# Patient Record
Sex: MALE | Race: ASIAN | Employment: FULL TIME | URBAN - METROPOLITAN AREA
[De-identification: names, ages, dates, MRNs, and addresses within clinical notes are randomized per-mention and may not be internally consistent; named-entity substitution may affect disease eponyms.]

---

## 2023-03-06 LAB
ALANINE AMINOTRANSFERASE (SGPT) (U/L) IN SER/PLAS: 31 U/L (ref 10–52)
ALBUMIN (G/DL) IN SER/PLAS: 4.2 G/DL (ref 3.4–5)
ALKALINE PHOSPHATASE (U/L) IN SER/PLAS: 91 U/L (ref 33–120)
ANION GAP IN SER/PLAS: 9 MMOL/L (ref 10–20)
ASPARTATE AMINOTRANSFERASE (SGOT) (U/L) IN SER/PLAS: 24 U/L (ref 9–39)
BASOPHILS (10*3/UL) IN BLOOD BY AUTOMATED COUNT: 0.02 X10E9/L (ref 0–0.1)
BASOPHILS/100 LEUKOCYTES IN BLOOD BY AUTOMATED COUNT: 0.4 % (ref 0–2)
BILIRUBIN TOTAL (MG/DL) IN SER/PLAS: 0.7 MG/DL (ref 0–1.2)
CALCIDIOL (25 OH VITAMIN D3) (NG/ML) IN SER/PLAS: 23 NG/ML
CALCIUM (MG/DL) IN SER/PLAS: 9 MG/DL (ref 8.6–10.3)
CARBON DIOXIDE, TOTAL (MMOL/L) IN SER/PLAS: 30 MMOL/L (ref 21–32)
CHLORIDE (MMOL/L) IN SER/PLAS: 106 MMOL/L (ref 98–107)
CHOLESTEROL (MG/DL) IN SER/PLAS: 146 MG/DL (ref 0–199)
CHOLESTEROL IN HDL (MG/DL) IN SER/PLAS: 60.5 MG/DL
CHOLESTEROL/HDL RATIO: 2.4
CREATININE (MG/DL) IN SER/PLAS: 0.98 MG/DL (ref 0.5–1.3)
EOSINOPHILS (10*3/UL) IN BLOOD BY AUTOMATED COUNT: 0.12 X10E9/L (ref 0–0.7)
EOSINOPHILS/100 LEUKOCYTES IN BLOOD BY AUTOMATED COUNT: 2.4 % (ref 0–6)
ERYTHROCYTE DISTRIBUTION WIDTH (RATIO) BY AUTOMATED COUNT: 13 % (ref 11.5–14.5)
ERYTHROCYTE MEAN CORPUSCULAR HEMOGLOBIN CONCENTRATION (G/DL) BY AUTOMATED: 33 G/DL (ref 32–36)
ERYTHROCYTE MEAN CORPUSCULAR VOLUME (FL) BY AUTOMATED COUNT: 94 FL (ref 80–100)
ERYTHROCYTES (10*6/UL) IN BLOOD BY AUTOMATED COUNT: 5.17 X10E12/L (ref 4.5–5.9)
GFR MALE: >90 ML/MIN/1.73M2
GLUCOSE (MG/DL) IN SER/PLAS: 90 MG/DL (ref 74–99)
HEMATOCRIT (%) IN BLOOD BY AUTOMATED COUNT: 48.5 % (ref 41–52)
HEMOGLOBIN (G/DL) IN BLOOD: 16 G/DL (ref 13.5–17.5)
IMMATURE GRANULOCYTES/100 LEUKOCYTES IN BLOOD BY AUTOMATED COUNT: 0.4 % (ref 0–0.9)
LDL: 73 MG/DL (ref 0–99)
LEUKOCYTES (10*3/UL) IN BLOOD BY AUTOMATED COUNT: 5 X10E9/L (ref 4.4–11.3)
LYMPHOCYTES (10*3/UL) IN BLOOD BY AUTOMATED COUNT: 1.2 X10E9/L (ref 1.2–4.8)
LYMPHOCYTES/100 LEUKOCYTES IN BLOOD BY AUTOMATED COUNT: 23.9 % (ref 13–44)
MONOCYTES (10*3/UL) IN BLOOD BY AUTOMATED COUNT: 0.39 X10E9/L (ref 0.1–1)
MONOCYTES/100 LEUKOCYTES IN BLOOD BY AUTOMATED COUNT: 7.8 % (ref 2–10)
NEUTROPHILS (10*3/UL) IN BLOOD BY AUTOMATED COUNT: 3.27 X10E9/L (ref 1.2–7.7)
NEUTROPHILS/100 LEUKOCYTES IN BLOOD BY AUTOMATED COUNT: 65.1 % (ref 40–80)
PLATELETS (10*3/UL) IN BLOOD AUTOMATED COUNT: 215 X10E9/L (ref 150–450)
POTASSIUM (MMOL/L) IN SER/PLAS: 4.5 MMOL/L (ref 3.5–5.3)
PROSTATE SPECIFIC ANTIGEN,SCREEN: 0.61 NG/ML (ref 0–4)
PROTEIN TOTAL: 6.6 G/DL (ref 6.4–8.2)
SODIUM (MMOL/L) IN SER/PLAS: 140 MMOL/L (ref 136–145)
THYROTROPIN (MIU/L) IN SER/PLAS BY DETECTION LIMIT <= 0.05 MIU/L: 0.61 MIU/L (ref 0.44–3.98)
TRIGLYCERIDE (MG/DL) IN SER/PLAS: 63 MG/DL (ref 0–149)
UREA NITROGEN (MG/DL) IN SER/PLAS: 18 MG/DL (ref 6–23)
VLDL: 13 MG/DL (ref 0–40)

## 2024-08-29 ENCOUNTER — LAB (OUTPATIENT)
Dept: LAB | Facility: LAB | Age: 54
End: 2024-08-29

## 2024-08-30 LAB — COTININE UR QL SCN: NEGATIVE

## 2024-12-13 NOTE — PROGRESS NOTES
12/16/2024 CC: 54 y.o. presents for glaucoma evaluation.    Past ocular history: Uveitis  Family history: Glaucoma in uncle  Past medical history: Sarcoidosis  Social history: denies tobacco     Eye medications:   Both eyes: none    Allergy:  NKDA    Testing:    HVF 24-2 12/16/2024: OD- Dense SAD involving fixation, early IAD, MD -19.1. OS- IAD>SAD approaching fixation, MD -20.3 dB    OCT 12/16/2024: Artifacts-OD- thin I/S, avg 86. OS- thin I/S avg 70 um.GCA: OD- thin IT (raphae), OS- thin ST (raphae), I. Mac:Regular macular contour, /241    Pachymetry 12/16/2024: 599/599    Gonioscopy 12/16/2024: open OU, few PAS OD    Tmax: unknown    Assessment:   Primary open angle glaucoma, severe stage OU  - Positive fhx  - Thicker C  - Large slight asymmetrical CDR OS>OD, tilted myopic disc  - He had 1 episode of uveitis OD (sarcoidosis) 14 yrs ago, was treated with topical/systemic steroid for 4 months  - Possible steroid response  - Testing: OCT I/S thin OS>OD, confounded by retina. HVF: Dense arcuates OS>OD  - Explained glaucoma is forever, cannot reverse LAN already sustained, patient can still go on to undergo vision loss form it if progresses in future. Our goals are to slow down LAN damage, but cannot FIX it. May require additional glaucoma procedures to stabilize glaucoma in future.  Target IOP: low teens  -IOP 12/16/2024:17/19    RE  - High Myopia    NS cataract  - CC, NVS    Plan:   I explained my findings. POAG, Severe, IOP above target    Eye medications:   Both eyes: Start Latanoprost qhs, Brimonidine bid    Return in 4 wks, IOP check

## 2024-12-16 ENCOUNTER — APPOINTMENT (OUTPATIENT)
Dept: OPHTHALMOLOGY | Age: 54
End: 2024-12-16
Payer: COMMERCIAL

## 2024-12-16 DIAGNOSIS — H40.1133 PRIMARY OPEN ANGLE GLAUCOMA (POAG) OF BOTH EYES, SEVERE STAGE: Primary | ICD-10-CM

## 2024-12-16 PROCEDURE — 92134 CPTRZ OPH DX IMG PST SGM RTA: CPT | Performed by: OPHTHALMOLOGY

## 2024-12-16 PROCEDURE — 76514 ECHO EXAM OF EYE THICKNESS: CPT | Performed by: OPHTHALMOLOGY

## 2024-12-16 PROCEDURE — 92083 EXTENDED VISUAL FIELD XM: CPT | Performed by: OPHTHALMOLOGY

## 2024-12-16 PROCEDURE — 92020 GONIOSCOPY: CPT | Performed by: OPHTHALMOLOGY

## 2024-12-16 PROCEDURE — 99204 OFFICE O/P NEW MOD 45 MIN: CPT | Performed by: OPHTHALMOLOGY

## 2024-12-16 RX ORDER — BRIMONIDINE TARTRATE 2 MG/ML
1 SOLUTION/ DROPS OPHTHALMIC 2 TIMES DAILY
Qty: 15 ML | Refills: 3 | Status: SHIPPED | OUTPATIENT
Start: 2024-12-16

## 2024-12-16 RX ORDER — LATANOPROST 50 UG/ML
1 SOLUTION/ DROPS OPHTHALMIC NIGHTLY
Qty: 2.5 ML | Refills: 3 | Status: SHIPPED | OUTPATIENT
Start: 2024-12-16 | End: 2025-03-16

## 2024-12-16 RX ORDER — BRIMONIDINE TARTRATE 2 MG/ML
1 SOLUTION/ DROPS OPHTHALMIC 2 TIMES DAILY
Qty: 15 ML | Refills: 3 | Status: SHIPPED | OUTPATIENT
Start: 2024-12-16 | End: 2025-03-16

## 2024-12-16 RX ORDER — BRIMONIDINE TARTRATE 2 MG/ML
1 SOLUTION/ DROPS OPHTHALMIC 2 TIMES DAILY
Qty: 15 ML | Refills: 3 | Status: SHIPPED | OUTPATIENT
Start: 2024-12-16 | End: 2024-12-16

## 2024-12-16 RX ORDER — LATANOPROST 50 UG/ML
1 SOLUTION/ DROPS OPHTHALMIC NIGHTLY
Qty: 2.5 ML | Refills: 3 | Status: SHIPPED | OUTPATIENT
Start: 2024-12-16 | End: 2024-12-16

## 2024-12-16 RX ORDER — LATANOPROST 50 UG/ML
1 SOLUTION/ DROPS OPHTHALMIC NIGHTLY
Qty: 2.5 ML | Refills: 3 | Status: SHIPPED | OUTPATIENT
Start: 2024-12-16

## 2024-12-16 ASSESSMENT — CONF VISUAL FIELD
OS_INFERIOR_TEMPORAL_RESTRICTION: 0
OD_SUPERIOR_TEMPORAL_RESTRICTION: 0
OS_NORMAL: 1
OD_NORMAL: 1
OS_SUPERIOR_NASAL_RESTRICTION: 0
OD_INFERIOR_TEMPORAL_RESTRICTION: 0
OD_SUPERIOR_NASAL_RESTRICTION: 0
OS_SUPERIOR_TEMPORAL_RESTRICTION: 0
OS_INFERIOR_NASAL_RESTRICTION: 0
OD_INFERIOR_NASAL_RESTRICTION: 0

## 2024-12-16 ASSESSMENT — ENCOUNTER SYMPTOMS
PSYCHIATRIC NEGATIVE: 0
HEMATOLOGIC/LYMPHATIC NEGATIVE: 0
EYES NEGATIVE: 1
RESPIRATORY NEGATIVE: 0
ENDOCRINE COMMENTS: BRIMONIDINE
MUSCULOSKELETAL NEGATIVE: 0
ENDOCRINE NEGATIVE: 0
NEUROLOGICAL NEGATIVE: 0
ALLERGIC/IMMUNOLOGIC NEGATIVE: 0
CONSTITUTIONAL NEGATIVE: 0
CARDIOVASCULAR NEGATIVE: 0
GASTROINTESTINAL NEGATIVE: 0

## 2024-12-16 ASSESSMENT — VISUAL ACUITY
OS_CC+: +1
OD_CC+: +1
OS_CC: 20/30
OD_CC: 20/30
CORRECTION_TYPE: GLASSES
METHOD: SNELLEN - LINEAR

## 2024-12-16 ASSESSMENT — TONOMETRY
OS_IOP_MMHG: 19
OD_IOP_MMHG: 16
OD_IOP_MMHG: 17
IOP_METHOD: TONOPEN
IOP_METHOD: GOLDMANN APPLANATION
OS_IOP_MMHG: 23

## 2024-12-16 ASSESSMENT — GONIOSCOPY
OS_NASAL: SS
OS_INFERIOR: SS
OS_TEMPORAL: SS
OD_TEMPORAL: SS
OD_INFERIOR: SS
OD_NASAL: SS
OD_SUPERIOR: SS
OS_SUPERIOR: SS

## 2024-12-16 ASSESSMENT — PACHYMETRY
OD_CT(UM): 599
OS_CT(UM): 599

## 2024-12-16 ASSESSMENT — EXTERNAL EXAM - RIGHT EYE: OD_EXAM: NORMAL

## 2024-12-16 ASSESSMENT — CUP TO DISC RATIO
OD_RATIO: 0.8
OS_RATIO: 0.9

## 2024-12-16 ASSESSMENT — SLIT LAMP EXAM - LIDS
COMMENTS: NORMAL
COMMENTS: NORMAL

## 2024-12-16 ASSESSMENT — EXTERNAL EXAM - LEFT EYE: OS_EXAM: NORMAL

## 2025-01-19 NOTE — PROGRESS NOTES
1/20/2025 CC: 54 y.o. presents for 1 mo glaucoma FU w/ IOP check    Past ocular history: Uveitis  Family history: Glaucoma in uncle  Past medical history: Sarcoidosis  Social history: denies tobacco     Eye medications:   Both eyes: Latanoprost qhs, Brimonidine bid    Allergy:  NKDA    Testing:    HVF 24-2 12/16/2024: OD- Dense SAD involving fixation, early IAD, MD -19.1. OS- IAD>SAD approaching fixation, MD -20.3 dB    OCT 12/16/2024: Artifacts-OD- thin I/S, avg 86. OS- thin I/S avg 70 um.GCA: OD- thin IT (raphae), OS- thin ST (raphae), I. Mac:Regular macular contour, /241    Pachymetry 12/16/2024: 599/599    Gonioscopy 12/16/2024: open OU, few PAS OD    Tmax: unknown    Assessment:   Primary open angle glaucoma, severe stage OU  - Positive fhx  - Thicker C  - Large slight asymmetrical CDR OS>OD, tilted myopic disc  - He had 1 episode of uveitis OD (sarcoidosis) 14 yrs ago, was treated with topical/systemic steroid for 4 months  - Possible steroid response  - Testing: OCT I/S thin OS>OD, confounded by retina. HVF: Dense arcuates OS>OD  - Explained glaucoma is forever, cannot reverse LAN already sustained, patient can still go on to undergo vision loss form it if progresses in future. Our goals are to slow down LAN damage, but cannot FIX it. May require additional glaucoma procedures to stabilize glaucoma in future.  Target IOP: low teens  -IOP 12/16/2024:17/19. Will start Latanoprost qhs , Brimonidine bid.  -1/20/2025: IOP 13/16, VA/exam stable. Will increase Brimonidine to TID OS and schedule for SLT after 2 weeks.    RE  - High Myopia    NS cataract  - CC, NVS    Plan:   I explained my findings. POAG, Severe, IOP at at target OD/bdl OS.    Eye medications:   Right eye: continue Brimonidine bid  Left eye: Increase Brimonidine to tid  Both eyes: continue Latanoprost qhs    Return in 2 weeks, possible SLT OS

## 2025-01-20 ENCOUNTER — APPOINTMENT (OUTPATIENT)
Dept: PRIMARY CARE | Facility: CLINIC | Age: 55
End: 2025-01-20
Payer: COMMERCIAL

## 2025-01-20 ENCOUNTER — APPOINTMENT (OUTPATIENT)
Dept: OPHTHALMOLOGY | Age: 55
End: 2025-01-20
Payer: COMMERCIAL

## 2025-01-20 DIAGNOSIS — H40.1133 PRIMARY OPEN ANGLE GLAUCOMA (POAG) OF BOTH EYES, SEVERE STAGE: Primary | ICD-10-CM

## 2025-01-20 PROCEDURE — 99212 OFFICE O/P EST SF 10 MIN: CPT | Performed by: OPHTHALMOLOGY

## 2025-01-20 ASSESSMENT — ENCOUNTER SYMPTOMS
GASTROINTESTINAL NEGATIVE: 0
CARDIOVASCULAR NEGATIVE: 0
PSYCHIATRIC NEGATIVE: 0
ENDOCRINE NEGATIVE: 0
CONSTITUTIONAL NEGATIVE: 0
EYES NEGATIVE: 1
HEMATOLOGIC/LYMPHATIC NEGATIVE: 0
MUSCULOSKELETAL NEGATIVE: 0
RESPIRATORY NEGATIVE: 0
NEUROLOGICAL NEGATIVE: 0
ALLERGIC/IMMUNOLOGIC NEGATIVE: 0

## 2025-01-20 ASSESSMENT — CUP TO DISC RATIO
OD_RATIO: 0.8
OS_RATIO: 0.9

## 2025-01-20 ASSESSMENT — VISUAL ACUITY
OD_CC: 20/30
METHOD: SNELLEN - LINEAR
CORRECTION_TYPE: GLASSES
OS_CC: 20/25
OS_CC+: -2
OD_CC+: +2

## 2025-01-20 ASSESSMENT — SLIT LAMP EXAM - LIDS
COMMENTS: NORMAL
COMMENTS: NORMAL

## 2025-01-20 ASSESSMENT — EXTERNAL EXAM - LEFT EYE: OS_EXAM: NORMAL

## 2025-01-20 ASSESSMENT — TONOMETRY
OS_IOP_MMHG: 16
IOP_METHOD: GOLDMANN APPLANATION
OD_IOP_MMHG: 13

## 2025-01-20 ASSESSMENT — PACHYMETRY
OS_CT(UM): 599
OD_CT(UM): 599

## 2025-01-20 ASSESSMENT — EXTERNAL EXAM - RIGHT EYE: OD_EXAM: NORMAL

## 2025-02-02 NOTE — PROGRESS NOTES
1/20/2025 CC: 54 y.o. presents for glaucoma FU w/ IOP check and SLT OS    Past ocular history: Uveitis  Family history: Glaucoma in uncle  Past medical history: Sarcoidosis  Social history: denies tobacco     Eye medications:   Both eyes: Latanoprost qhs, Brimonidine tid    Allergy:  NKDA    Testing:    HVF 24-2 12/16/2024: OD- Dense SAD involving fixation, early IAD, MD -19.1. OS- IAD>SAD approaching fixation, MD -20.3 dB    OCT 12/16/2024: Artifacts-OD- thin I/S, avg 86. OS- thin I/S avg 70 um.GCA: OD- thin IT (raphae), OS- thin ST (raphae), I. Mac:Regular macular contour, /241    Pachymetry 12/16/2024: 599/599    Gonioscopy 12/16/2024: open OU, few PAS OD    Tmax: unknown    Assessment:   Primary open angle glaucoma, severe stage OU  - Positive fhx  - Thicker C  - Large slight asymmetrical CDR OS>OD, tilted myopic disc  - He had 1 episode of uveitis OD (sarcoidosis) 14 yrs ago, was treated with topical/systemic steroid for 4 months  - Possible steroid response  - Testing: OCT I/S thin OS>OD, confounded by retina. HVF: Dense arcuates OS>OD  - Explained glaucoma is forever, cannot reverse LAN already sustained, patient can still go on to undergo vision loss form it if progresses in future. Our goals are to slow down LAN damage, but cannot FIX it. May require additional glaucoma procedures to stabilize glaucoma in future.  Target IOP: low teens  -IOP 12/16/2024:17/19. Will start Latanoprost qhs , Brimonidine bid.  -1/20/2025: IOP 13/16, VA/exam stable. Will increase Brimonidine to TID OS and schedule for SLT after 2 weeks.  - 2/3/2025: 13/14. SLT OS today    RE  - High Myopia    NS cataract  - CC, NVS    Plan:   I explained my findings. POAG, Severe, IOP acceptable. SLT OS    Eye medications:   Right eye: continue Brimonidine bid  Left eye: continue Brimonidine to tid  Both eyes: continue Latanoprost qhs    Explained R/B/A SLT OS. Patient would like to proceed.  Iopidine/Brimonidine pre,    Laser  settings:  360 degree (mainly N/I)  Total shots: 80  Energy: 0.8 mj  Total energy: 64 mj    Iopidine post,  IOP Pre 14, IOP Post 14    Ketorolac  QID for 4 days    RTC 4-6 weeks, IOP check

## 2025-02-03 ENCOUNTER — APPOINTMENT (OUTPATIENT)
Dept: OPHTHALMOLOGY | Age: 55
End: 2025-02-03
Payer: COMMERCIAL

## 2025-02-03 DIAGNOSIS — Z98.890 S/P LASER TRABECULOPLASTY OF EYE: ICD-10-CM

## 2025-02-03 DIAGNOSIS — H40.1133 PRIMARY OPEN ANGLE GLAUCOMA (POAG) OF BOTH EYES, SEVERE STAGE: Primary | ICD-10-CM

## 2025-02-03 DIAGNOSIS — H40.1133 PRIMARY OPEN ANGLE GLAUCOMA (POAG) OF BOTH EYES, SEVERE STAGE: ICD-10-CM

## 2025-02-03 PROCEDURE — 65855 TRABECULOPLASTY LASER SURG: CPT | Mod: LEFT SIDE | Performed by: OPHTHALMOLOGY

## 2025-02-03 RX ORDER — KETOROLAC TROMETHAMINE 4 MG/ML
1 SOLUTION/ DROPS OPHTHALMIC 4 TIMES DAILY
Qty: 5 ML | Refills: 0 | Status: SHIPPED | OUTPATIENT
Start: 2025-02-03 | End: 2025-02-03

## 2025-02-03 RX ORDER — DICLOFENAC SODIUM 1 MG/ML
1 SOLUTION/ DROPS OPHTHALMIC 4 TIMES DAILY
Qty: 5 ML | Refills: 0 | Status: SHIPPED | OUTPATIENT
Start: 2025-02-03 | End: 2025-02-07

## 2025-02-03 ASSESSMENT — TONOMETRY
IOP_METHOD: GOLDMANN APPLANATION
OS_IOP_MMHG: 14
OD_IOP_MMHG: 13

## 2025-02-03 ASSESSMENT — PACHYMETRY
OS_CT(UM): 599
OD_CT(UM): 599

## 2025-02-03 ASSESSMENT — CUP TO DISC RATIO
OS_RATIO: 0.9
OD_RATIO: 0.8

## 2025-02-03 ASSESSMENT — VISUAL ACUITY
CORRECTION_TYPE: GLASSES
METHOD: SNELLEN - LINEAR
OD_CC: 20/30
OD_CC+: +2
OS_CC: 20/25

## 2025-02-03 ASSESSMENT — EXTERNAL EXAM - RIGHT EYE: OD_EXAM: NORMAL

## 2025-02-03 ASSESSMENT — EXTERNAL EXAM - LEFT EYE: OS_EXAM: NORMAL

## 2025-02-03 ASSESSMENT — ENCOUNTER SYMPTOMS: EYES NEGATIVE: 1

## 2025-02-03 ASSESSMENT — SLIT LAMP EXAM - LIDS
COMMENTS: NORMAL
COMMENTS: NORMAL

## 2025-02-10 ENCOUNTER — APPOINTMENT (OUTPATIENT)
Dept: PRIMARY CARE | Facility: CLINIC | Age: 55
End: 2025-02-10
Payer: COMMERCIAL

## 2025-02-10 ENCOUNTER — HOSPITAL ENCOUNTER (OUTPATIENT)
Dept: RADIOLOGY | Facility: CLINIC | Age: 55
Discharge: HOME | End: 2025-02-10
Payer: COMMERCIAL

## 2025-02-10 VITALS
WEIGHT: 155 LBS | SYSTOLIC BLOOD PRESSURE: 110 MMHG | BODY MASS INDEX: 23.49 KG/M2 | DIASTOLIC BLOOD PRESSURE: 60 MMHG | HEIGHT: 68 IN

## 2025-02-10 DIAGNOSIS — Z00.00 ROUTINE GENERAL MEDICAL EXAMINATION AT A HEALTH CARE FACILITY: Primary | ICD-10-CM

## 2025-02-10 DIAGNOSIS — E55.9 VITAMIN D DEFICIENCY: ICD-10-CM

## 2025-02-10 DIAGNOSIS — Z86.2 HISTORY OF SARCOIDOSIS: ICD-10-CM

## 2025-02-10 DIAGNOSIS — Z12.11 SCREENING FOR MALIGNANT NEOPLASM OF COLON: ICD-10-CM

## 2025-02-10 DIAGNOSIS — Z12.5 SCREENING FOR PROSTATE CANCER: ICD-10-CM

## 2025-02-10 PROCEDURE — 1036F TOBACCO NON-USER: CPT | Performed by: INTERNAL MEDICINE

## 2025-02-10 PROCEDURE — 71046 X-RAY EXAM CHEST 2 VIEWS: CPT

## 2025-02-10 PROCEDURE — 3008F BODY MASS INDEX DOCD: CPT | Performed by: INTERNAL MEDICINE

## 2025-02-10 PROCEDURE — 99396 PREV VISIT EST AGE 40-64: CPT | Performed by: INTERNAL MEDICINE

## 2025-02-10 PROCEDURE — 71046 X-RAY EXAM CHEST 2 VIEWS: CPT | Performed by: RADIOLOGY

## 2025-02-10 ASSESSMENT — ENCOUNTER SYMPTOMS
LOSS OF SENSATION IN FEET: 0
DEPRESSION: 0
OCCASIONAL FEELINGS OF UNSTEADINESS: 0

## 2025-02-10 ASSESSMENT — COLUMBIA-SUICIDE SEVERITY RATING SCALE - C-SSRS
6. HAVE YOU EVER DONE ANYTHING, STARTED TO DO ANYTHING, OR PREPARED TO DO ANYTHING TO END YOUR LIFE?: NO
2. HAVE YOU ACTUALLY HAD ANY THOUGHTS OF KILLING YOURSELF?: NO
1. IN THE PAST MONTH, HAVE YOU WISHED YOU WERE DEAD OR WISHED YOU COULD GO TO SLEEP AND NOT WAKE UP?: NO

## 2025-02-10 ASSESSMENT — PATIENT HEALTH QUESTIONNAIRE - PHQ9
SUM OF ALL RESPONSES TO PHQ9 QUESTIONS 1 AND 2: 0
1. LITTLE INTEREST OR PLEASURE IN DOING THINGS: NOT AT ALL
2. FEELING DOWN, DEPRESSED OR HOPELESS: NOT AT ALL

## 2025-02-10 NOTE — PROGRESS NOTES
Subjective   Patient ID: Jim John is a 54 y.o. male who presents for Annual Exam.    HPI  Patient comes in for a physical exam last one done over a year ago , doing well over-all with no particular complaints. Also is in for laboratory review and health maintenance update.  Updating family history as well.  Interval event - past medical history, surgical, social, and family history reviewed and updated.  Interval care -  Patient is    up to date with dental care.  Patient does     receive routine vision care.    Review of Systems  General: Denies fever, chills, night sweats, changes in appetite or weight  ENT: Negative for ear pain, hearing loss, headache, difficulty swallowing, up to date with dental checks   Eyes: Negative for recent visual changes, up to date with eye exams  Dermatologic: Negative for new skin conditions, rash  Respiratory: Negative for paroxysmal nocturnal dyspnea, wheezing,shortness of breath, cough  Cardiovascular: Negative for chest pain, palpitations, or leg swelling  Gastrointestinal: Negative for nausea/vomiting, abdominal pain, changes in bowel habits  Genitourinary: Negative for dysuria, urgency, frequency  incontinence  Neurological: Negative for headaches, tremors, dizziness, memory loss, confusion, weakness, paresthesias  Psychiatric: Negative for sleep problems, anxiety, depression, conditions are stable  Endocrine: Negative for heat or cold intolerance, polyuria, polydipsia  Other:All systems have been reviewed and are negative except as previously noted.    Previous history  Past Medical History:   Diagnosis Date    Edema, unspecified 09/08/2021    Dependent edema    Encounter for immunization 09/08/2021    Need for tetanus booster    Encounter for screening for malignant neoplasm of colon 09/08/2021    Colon cancer screening    Encounter for screening for malignant neoplasm of prostate 07/26/2022    Prostate cancer screening    Unspecified skin changes 07/06/2015    Recent skin  changes     Past Surgical History:   Procedure Laterality Date    OTHER SURGICAL HISTORY  06/22/2020    No history of surgery     Social History     Tobacco Use    Smoking status: Never    Smokeless tobacco: Never   Vaping Use    Vaping status: Never Used   Substance Use Topics    Alcohol use: Not Currently    Drug use: Never     No family history on file.  No Known Allergies  Current Outpatient Medications   Medication Instructions    brimonidine (AlphaGAN) 0.2 % ophthalmic solution 1 drop, Both Eyes, 2 times daily    brimonidine (AlphaGAN) 0.2 % ophthalmic solution 1 drop, Both Eyes, 2 times daily    latanoprost (Xalatan) 0.005 % ophthalmic solution 1 drop, Both Eyes, Nightly    latanoprost (Xalatan) 0.005 % ophthalmic solution 1 drop, Both Eyes, Nightly       Objective       Physical Exam  Vital Signs: as recorded above  General: Well groomed, well nourished   Orientation:  Alert , oriented to time, place , and person   Mood and Affect:  Cooperative , no apparent distress normal affect  Skin: Good color, good turgor  Eyes: Extra ocular muscle movements intact, anicteric sclerae,with trace injections left eye   Ears:  Auditory canals clear , TMS intact   Neck: Supple, full range of movement  Chest: Normal breath sounds, normal chest wall exam, symmetric, good air entry, clear to auscultation  Heart: Regular rate and rhythm, without murmur, gallop, or rubs  Abdomen soft nontender no masses felt no hepatosplenomegaly, no rebound or guarding  BACK:  no CTLS spine tenderness, no flank tenderness  Extremities: full range of movement  bilateral UE and bilateral LE,  no lower extremity edema  Neurological: Alert, oriented, cranial nerves II-XII intact except for visual acuity  Sensation:  Intact   Gait: normal steady      Assessment/Plan   Liming Dora is a 54 y.o. male who presents for the concerns below:    Problem List Items Addressed This Visit    None    Monitor sarcoid, he is utd with eye checks, will do cxr  today  Glaucoma , utd with ophthalmology Dr Jacinto , just had laser tx left eye last week    ASSESSMENT AND PLAN: Patient on examination is in good health , concerns above, screening blood tests to screen for high cholesterol, diabetes, thyroid, Prostate Surface Antigen (PSA) for age 50 and above sooner if with family history of prostate cancer or with symptoms.   Preventive Medicine: colon cancer screening , the GI specialist will do LOS prior to the scope ,follow  balanced diet, and exercise as discussed. Seat belt use for injury prevention  Substance use and /or tobacco use not applicable / counseled when applicable. Immunizations TD  up to date.  Yearly flu vaccine unless contraindicated .Patient should be taking enough calcium in a balanced die More than 50% of office visit time spent counseling the patient, questions were answered. Complete physical examination in a year  Patient knows either has access to  [employer] to see results and messages regarding these or will call us if cannot see them      Discussed with:   Return in :    Portions of this note were generated using digital voice recognition software, and may contain grammatical errors       Nadya Chandler MD  02/10/25  9:03 AM

## 2025-02-10 NOTE — PATIENT INSTRUCTIONS
COLONOSCOPY SCHEDULING   [facility]                                        [phone]  #2 Dr Isaac Akers, Dr Brayden James

## 2025-02-11 DIAGNOSIS — E55.9 VITAMIN D DEFICIENCY: Primary | ICD-10-CM

## 2025-02-11 LAB
25(OH)D3+25(OH)D2 SERPL-MCNC: 24 NG/ML (ref 30–100)
ALBUMIN SERPL-MCNC: 4.1 G/DL (ref 3.6–5.1)
ALP SERPL-CCNC: 82 U/L (ref 35–144)
ALT SERPL-CCNC: 23 U/L (ref 9–46)
ANION GAP SERPL CALCULATED.4IONS-SCNC: 5 MMOL/L (CALC) (ref 7–17)
APPEARANCE UR: CLEAR
AST SERPL-CCNC: 17 U/L (ref 10–35)
BASOPHILS # BLD AUTO: 52 CELLS/UL (ref 0–200)
BASOPHILS NFR BLD AUTO: 0.9 %
BILIRUB SERPL-MCNC: 0.5 MG/DL (ref 0.2–1.2)
BILIRUB UR QL STRIP: NEGATIVE
BUN SERPL-MCNC: 17 MG/DL (ref 7–25)
CALCIUM SERPL-MCNC: 8.8 MG/DL (ref 8.6–10.3)
CHLORIDE SERPL-SCNC: 109 MMOL/L (ref 98–110)
CHOLEST SERPL-MCNC: 147 MG/DL
CHOLEST/HDLC SERPL: 2.9 (CALC)
CO2 SERPL-SCNC: 28 MMOL/L (ref 20–32)
COLOR UR: YELLOW
CREAT SERPL-MCNC: 0.93 MG/DL (ref 0.7–1.3)
EGFRCR SERPLBLD CKD-EPI 2021: 96 ML/MIN/1.73M2
EOSINOPHIL # BLD AUTO: 249 CELLS/UL (ref 15–500)
EOSINOPHIL NFR BLD AUTO: 4.3 %
ERYTHROCYTE [DISTWIDTH] IN BLOOD BY AUTOMATED COUNT: 12.7 % (ref 11–15)
GLUCOSE SERPL-MCNC: 100 MG/DL (ref 65–99)
GLUCOSE UR QL STRIP: NEGATIVE
HCT VFR BLD AUTO: 48.5 % (ref 38.5–50)
HDLC SERPL-MCNC: 50 MG/DL
HGB BLD-MCNC: 15.5 G/DL (ref 13.2–17.1)
HGB UR QL STRIP: NEGATIVE
KETONES UR QL STRIP: NEGATIVE
LDLC SERPL CALC-MCNC: 80 MG/DL (CALC)
LEUKOCYTE ESTERASE UR QL STRIP: NEGATIVE
LYMPHOCYTES # BLD AUTO: 1537 CELLS/UL (ref 850–3900)
LYMPHOCYTES NFR BLD AUTO: 26.5 %
MCH RBC QN AUTO: 30.9 PG (ref 27–33)
MCHC RBC AUTO-ENTMCNC: 32 G/DL (ref 32–36)
MCV RBC AUTO: 96.6 FL (ref 80–100)
MONOCYTES # BLD AUTO: 383 CELLS/UL (ref 200–950)
MONOCYTES NFR BLD AUTO: 6.6 %
NEUTROPHILS # BLD AUTO: 3579 CELLS/UL (ref 1500–7800)
NEUTROPHILS NFR BLD AUTO: 61.7 %
NITRITE UR QL STRIP: NEGATIVE
NONHDLC SERPL-MCNC: 97 MG/DL (CALC)
PH UR STRIP: NORMAL [PH] (ref 5–8)
PLATELET # BLD AUTO: 230 THOUSAND/UL (ref 140–400)
PMV BLD REES-ECKER: 9.4 FL (ref 7.5–12.5)
POTASSIUM SERPL-SCNC: 4.6 MMOL/L (ref 3.5–5.3)
PROT SERPL-MCNC: 6.5 G/DL (ref 6.1–8.1)
PROT UR QL STRIP: NEGATIVE
PSA SERPL-MCNC: 0.65 NG/ML
RBC # BLD AUTO: 5.02 MILLION/UL (ref 4.2–5.8)
SODIUM SERPL-SCNC: 142 MMOL/L (ref 135–146)
SP GR UR STRIP: 1.02 (ref 1–1.03)
TRIGL SERPL-MCNC: 85 MG/DL
TSH SERPL-ACNC: 0.72 MIU/L (ref 0.4–4.5)
WBC # BLD AUTO: 5.8 THOUSAND/UL (ref 3.8–10.8)

## 2025-02-11 RX ORDER — ASPIRIN 325 MG
50000 TABLET, DELAYED RELEASE (ENTERIC COATED) ORAL WEEKLY
Qty: 12 CAPSULE | Refills: 1 | Status: SHIPPED | OUTPATIENT
Start: 2025-02-11 | End: 2025-07-29

## 2025-02-27 NOTE — PROGRESS NOTES
3/3/2025 CC: 54 y.o. presents for glaucoma FU w/ IOP check    Past ocular history: Uveitis  Family history: Glaucoma in uncle  Past medical history: Sarcoidosis  Social history: denies tobacco     Eye medications:   Both eyes: Latanoprost qhs, Brimonidine tid    Allergy:  NKDA    Testing:    HVF 24-2 12/16/2024: OD- Dense SAD involving fixation, early IAD, MD -19.1. OS- IAD>SAD approaching fixation, MD -20.3 dB    OCT 12/16/2024: Artifacts-OD- thin I/S, avg 86. OS- thin I/S avg 70 um. GCA: OD- thin IT (raphae), OS- thin ST (raphae), I. Mac:Regular macular contour, /241    Pachymetry 12/16/2024: 599/599    Gonioscopy 12/16/2024: open OU, few PAS OD    Tmax: unknown    Assessment:   Primary open angle glaucoma, severe stage OU  - Positive fhx  - Thicker C  - Large slight asymmetrical CDR OS>OD, tilted myopic disc  - He had 1 episode of uveitis OD (sarcoidosis) 14 yrs ago, was treated with topical/systemic steroid for 4 months  - Possible steroid response  - Testing: OCT I/S thin OS>OD, confounded by retina. HVF: Dense arcuates OS>OD  - Explained glaucoma is forever, cannot reverse LAN already sustained, patient can still go on to undergo vision loss form it if progresses in future. Our goals are to slow down LAN damage, but cannot FIX it. May require additional glaucoma procedures to stabilize glaucoma in future.  Target IOP: low teens  -IOP 12/16/2024:17/19. Will start Latanoprost qhs , Brimonidine bid.  -1/20/2025: IOP 13/16, VA/exam stable. Will increase Brimonidine to TID OS and schedule for SLT after 2 weeks.  - 2/3/2025: IOP 13/14. SLT OS today  - 3/3/2025:IOP 13 OU. Stable exam    RE  - High Myopia    NS cataract  - CC, NVS    Plan:   I explained my findings. POAG, Severe, IOP acceptable.     Eye medications:   Right eye: continue Brimonidine bid  Left eye: continue Brimonidine to tid  Both eyes: continue Latanoprost qhs    RTC 3 mo, F

## 2025-03-03 ENCOUNTER — APPOINTMENT (OUTPATIENT)
Dept: OPHTHALMOLOGY | Age: 55
End: 2025-03-03
Payer: COMMERCIAL

## 2025-03-03 DIAGNOSIS — H40.1133 PRIMARY OPEN ANGLE GLAUCOMA (POAG) OF BOTH EYES, SEVERE STAGE: Primary | ICD-10-CM

## 2025-03-03 PROCEDURE — 99024 POSTOP FOLLOW-UP VISIT: CPT | Performed by: OPHTHALMOLOGY

## 2025-03-03 RX ORDER — LATANOPROST 50 UG/ML
1 SOLUTION/ DROPS OPHTHALMIC NIGHTLY
Qty: 7.5 ML | Refills: 4 | Status: SHIPPED | OUTPATIENT
Start: 2025-03-03 | End: 2026-03-03

## 2025-03-03 RX ORDER — BRIMONIDINE TARTRATE 2 MG/ML
1 SOLUTION/ DROPS OPHTHALMIC 3 TIMES DAILY
Qty: 45 ML | Refills: 4 | Status: SHIPPED | OUTPATIENT
Start: 2025-03-03 | End: 2026-03-03

## 2025-03-03 ASSESSMENT — TONOMETRY
OS_IOP_MMHG: 13
OD_IOP_MMHG: 13
IOP_METHOD: GOLDMANN APPLANATION

## 2025-03-03 ASSESSMENT — VISUAL ACUITY
OD_CC: 20/25
OS_CC: 20/25
OS_CC+: -1+2
METHOD: SNELLEN - LINEAR
OD_CC+: -2
CORRECTION_TYPE: GLASSES

## 2025-03-03 ASSESSMENT — PACHYMETRY
OD_CT(UM): 599
OS_CT(UM): 599

## 2025-03-03 ASSESSMENT — ENCOUNTER SYMPTOMS: EYES NEGATIVE: 1

## 2025-03-17 ENCOUNTER — APPOINTMENT (OUTPATIENT)
Dept: OPHTHALMOLOGY | Age: 55
End: 2025-03-17
Payer: COMMERCIAL

## 2025-06-16 ENCOUNTER — APPOINTMENT (OUTPATIENT)
Dept: OPHTHALMOLOGY | Age: 55
End: 2025-06-16
Payer: COMMERCIAL

## 2025-06-30 ENCOUNTER — APPOINTMENT (OUTPATIENT)
Dept: OPHTHALMOLOGY | Age: 55
End: 2025-06-30
Payer: COMMERCIAL

## 2025-07-17 NOTE — PROGRESS NOTES
7/21/2025  CC: 54 y.o. presents for glaucoma FU w/ HVF. Missed previous appointment.     Past ocular history: Uveitis. POAG, SLT OS  Family history: Glaucoma in uncle  Past medical history: Sarcoidosis  Social history: denies tobacco     Eye medications:   Both eyes: Latanoprost qhs, Brimonidine tid    Allergy:  NKDA    Testing:    HVF 24-2 7/21/2025: OD- Dense SAD involving fixation, early IAD, MD -19.8. OS- IAD>SAD approaching fixation, MD -22 dB  HVF 24-2 12/16/2024: OD- Dense SAD involving fixation, early IAD, MD -19.1. OS- IAD>SAD approaching fixation, MD -20.3 dB    OCT 12/16/2024: Artifacts-OD- thin I/S, avg 86. OS- thin I/S avg 70 um. GCA: OD- thin IT (raphae), OS- thin ST (raphae), I. Mac:Regular macular contour, /241    Pachymetry 12/16/2024: 599/599    Gonioscopy 12/16/2024: open OU, few PAS OD    Tmax: unknown    Assessment:   Primary open angle glaucoma, severe stage OU  - Positive fhx  - Thicker C  - Large slight asymmetrical CDR OS>OD, tilted myopic disc  - He had 1 episode of uveitis OD (sarcoidosis) 14 yrs ago, was treated with topical/systemic steroid for 4 months  - Possible steroid response  - Testing: OCT I/S thin OS>OD, confounded by retina. HVF: Dense arcuates OS>OD  - Explained glaucoma is forever, cannot reverse LAN already sustained, patient can still go on to undergo vision loss form it if progresses in future. Our goals are to slow down LAN damage, but cannot FIX it. May require additional glaucoma procedures to stabilize glaucoma in future.  Target IOP: low teens  - IOP 12/16/2024:17/19. Will start Latanoprost qhs , Brimonidine bid.  - 1/20/2025: IOP 13/16, VA/exam stable. Will increase Brimonidine to TID OS and schedule for SLT after 2 weeks.  2/3/2025: IOP 13/14. SLT OS  - 3/3/2025:IOP 13 OU. Stable exam  - 7/21/2025: IOP 14/13. Stable exam/HVF. R/B/A surgery vs gtts. Elected to continue gtts    RE  - High Myopia    NS cataract  - CC, NVS    Plan:   I explained my findings.  POAG, stable     Eye medications:   Both eyes: continue Latanoprost qhs, Brimonidine tid    RTC 3 mo, OCT

## 2025-07-21 ENCOUNTER — APPOINTMENT (OUTPATIENT)
Dept: OPHTHALMOLOGY | Age: 55
End: 2025-07-21
Payer: COMMERCIAL

## 2025-07-21 DIAGNOSIS — H40.1133 PRIMARY OPEN ANGLE GLAUCOMA (POAG) OF BOTH EYES, SEVERE STAGE: Primary | ICD-10-CM

## 2025-07-21 PROCEDURE — 99212 OFFICE O/P EST SF 10 MIN: CPT | Performed by: OPHTHALMOLOGY

## 2025-07-21 PROCEDURE — 92083 EXTENDED VISUAL FIELD XM: CPT | Performed by: OPHTHALMOLOGY

## 2025-07-21 RX ORDER — BRIMONIDINE TARTRATE 2 MG/ML
1 SOLUTION/ DROPS OPHTHALMIC 3 TIMES DAILY
Qty: 45 ML | Refills: 4 | Status: SHIPPED | OUTPATIENT
Start: 2025-07-21 | End: 2026-07-21

## 2025-07-21 RX ORDER — LATANOPROST 50 UG/ML
1 SOLUTION/ DROPS OPHTHALMIC NIGHTLY
Qty: 7.5 ML | Refills: 4 | Status: SHIPPED | OUTPATIENT
Start: 2025-07-21 | End: 2026-07-21

## 2025-07-21 ASSESSMENT — TONOMETRY
OS_IOP_MMHG: 13
OD_IOP_MMHG: 14
IOP_METHOD: GOLDMANN APPLANATION

## 2025-07-21 ASSESSMENT — VISUAL ACUITY
OD_CC+: -2
OS_CC: 20/25
CORRECTION_TYPE: GLASSES
OS_CC+: -2
OD_CC: 20/25
METHOD: SNELLEN - LINEAR

## 2025-07-21 ASSESSMENT — SLIT LAMP EXAM - LIDS
COMMENTS: NORMAL
COMMENTS: NORMAL

## 2025-07-21 ASSESSMENT — PACHYMETRY
OS_CT(UM): 599
OD_CT(UM): 599

## 2025-07-21 ASSESSMENT — CUP TO DISC RATIO
OS_RATIO: 0.9
OD_RATIO: 0.8

## 2025-07-21 ASSESSMENT — EXTERNAL EXAM - LEFT EYE: OS_EXAM: NORMAL

## 2025-07-21 ASSESSMENT — EXTERNAL EXAM - RIGHT EYE: OD_EXAM: NORMAL

## 2025-07-27 DIAGNOSIS — E55.9 VITAMIN D DEFICIENCY: ICD-10-CM

## 2025-07-28 RX ORDER — ASPIRIN 325 MG
1.25 TABLET, DELAYED RELEASE (ENTERIC COATED) ORAL
Qty: 12 CAPSULE | Refills: 1 | OUTPATIENT
Start: 2025-07-28

## 2025-10-20 ENCOUNTER — APPOINTMENT (OUTPATIENT)
Dept: OPHTHALMOLOGY | Age: 55
End: 2025-10-20
Payer: COMMERCIAL